# Patient Record
Sex: FEMALE | Race: WHITE | NOT HISPANIC OR LATINO
[De-identification: names, ages, dates, MRNs, and addresses within clinical notes are randomized per-mention and may not be internally consistent; named-entity substitution may affect disease eponyms.]

---
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
## 2023-06-17 ENCOUNTER — APPOINTMENT (OUTPATIENT)
Dept: URGENT CARE | Age: 18
End: 2023-06-17

## 2024-09-12 ENCOUNTER — HOSPITAL ENCOUNTER (EMERGENCY)
Facility: CLINIC | Age: 19
Discharge: HOME | End: 2024-09-12
Attending: EMERGENCY MEDICINE | Admitting: EMERGENCY MEDICINE
Payer: COMMERCIAL

## 2024-09-12 ENCOUNTER — APPOINTMENT (OUTPATIENT)
Dept: CT IMAGING | Facility: CLINIC | Age: 19
End: 2024-09-12
Attending: EMERGENCY MEDICINE
Payer: COMMERCIAL

## 2024-09-12 VITALS
WEIGHT: 140 LBS | BODY MASS INDEX: 21.97 KG/M2 | HEIGHT: 67 IN | TEMPERATURE: 97.6 F | HEART RATE: 51 BPM | SYSTOLIC BLOOD PRESSURE: 103 MMHG | DIASTOLIC BLOOD PRESSURE: 73 MMHG | OXYGEN SATURATION: 99 % | RESPIRATION RATE: 14 BRPM

## 2024-09-12 DIAGNOSIS — E87.6 HYPOKALEMIA: ICD-10-CM

## 2024-09-12 DIAGNOSIS — D64.9 ANEMIA, UNSPECIFIED TYPE: ICD-10-CM

## 2024-09-12 DIAGNOSIS — R00.1 SINUS BRADYCARDIA: ICD-10-CM

## 2024-09-12 DIAGNOSIS — R55 SYNCOPE, UNSPECIFIED SYNCOPE TYPE: ICD-10-CM

## 2024-09-12 DIAGNOSIS — S09.90XA CLOSED HEAD INJURY, INITIAL ENCOUNTER: ICD-10-CM

## 2024-09-12 LAB
ANION GAP SERPL CALCULATED.3IONS-SCNC: 10 MMOL/L (ref 7–15)
ATRIAL RATE - MUSE: 40 BPM
BASOPHILS # BLD AUTO: 0.1 10E3/UL (ref 0–0.2)
BASOPHILS NFR BLD AUTO: 1 %
BUN SERPL-MCNC: 13.9 MG/DL (ref 6–20)
CALCIUM SERPL-MCNC: 9.1 MG/DL (ref 8.8–10.4)
CHLORIDE SERPL-SCNC: 103 MMOL/L (ref 98–107)
CREAT SERPL-MCNC: 1.02 MG/DL (ref 0.51–0.95)
DIASTOLIC BLOOD PRESSURE - MUSE: NORMAL MMHG
EGFRCR SERPLBLD CKD-EPI 2021: 80 ML/MIN/1.73M2
EOSINOPHIL # BLD AUTO: 0.1 10E3/UL (ref 0–0.7)
EOSINOPHIL NFR BLD AUTO: 2 %
ERYTHROCYTE [DISTWIDTH] IN BLOOD BY AUTOMATED COUNT: 17 % (ref 10–15)
GLUCOSE BLDC GLUCOMTR-MCNC: 79 MG/DL (ref 70–99)
GLUCOSE SERPL-MCNC: 89 MG/DL (ref 70–99)
HCG SERPL QL: NEGATIVE
HCO3 SERPL-SCNC: 24 MMOL/L (ref 22–29)
HCT VFR BLD AUTO: 34.8 % (ref 35–47)
HGB BLD-MCNC: 10.5 G/DL (ref 11.7–15.7)
HOLD SPECIMEN: NORMAL
IMM GRANULOCYTES # BLD: 0 10E3/UL
IMM GRANULOCYTES NFR BLD: 0 %
INTERPRETATION ECG - MUSE: NORMAL
LYMPHOCYTES # BLD AUTO: 2.8 10E3/UL (ref 0.8–5.3)
LYMPHOCYTES NFR BLD AUTO: 53 %
MAGNESIUM SERPL-MCNC: 2.1 MG/DL (ref 1.7–2.3)
MCH RBC QN AUTO: 23.2 PG (ref 26.5–33)
MCHC RBC AUTO-ENTMCNC: 30.2 G/DL (ref 31.5–36.5)
MCV RBC AUTO: 77 FL (ref 78–100)
MONOCYTES # BLD AUTO: 0.4 10E3/UL (ref 0–1.3)
MONOCYTES NFR BLD AUTO: 8 %
NEUTROPHILS # BLD AUTO: 1.9 10E3/UL (ref 1.6–8.3)
NEUTROPHILS NFR BLD AUTO: 36 %
NRBC # BLD AUTO: 0 10E3/UL
NRBC BLD AUTO-RTO: 0 /100
P AXIS - MUSE: 59 DEGREES
PLATELET # BLD AUTO: 289 10E3/UL (ref 150–450)
POTASSIUM SERPL-SCNC: 3.3 MMOL/L (ref 3.4–5.3)
PR INTERVAL - MUSE: 200 MS
QRS DURATION - MUSE: 100 MS
QT - MUSE: 490 MS
QTC - MUSE: 399 MS
R AXIS - MUSE: 77 DEGREES
RBC # BLD AUTO: 4.53 10E6/UL (ref 3.8–5.2)
SODIUM SERPL-SCNC: 137 MMOL/L (ref 135–145)
SYSTOLIC BLOOD PRESSURE - MUSE: NORMAL MMHG
T AXIS - MUSE: 45 DEGREES
VENTRICULAR RATE- MUSE: 40 BPM
WBC # BLD AUTO: 5.3 10E3/UL (ref 4–11)

## 2024-09-12 PROCEDURE — 96360 HYDRATION IV INFUSION INIT: CPT

## 2024-09-12 PROCEDURE — 258N000003 HC RX IP 258 OP 636: Performed by: EMERGENCY MEDICINE

## 2024-09-12 PROCEDURE — 36415 COLL VENOUS BLD VENIPUNCTURE: CPT | Performed by: EMERGENCY MEDICINE

## 2024-09-12 PROCEDURE — 82962 GLUCOSE BLOOD TEST: CPT

## 2024-09-12 PROCEDURE — 84703 CHORIONIC GONADOTROPIN ASSAY: CPT | Performed by: EMERGENCY MEDICINE

## 2024-09-12 PROCEDURE — 99285 EMERGENCY DEPT VISIT HI MDM: CPT | Mod: 25

## 2024-09-12 PROCEDURE — 85041 AUTOMATED RBC COUNT: CPT | Performed by: EMERGENCY MEDICINE

## 2024-09-12 PROCEDURE — 70450 CT HEAD/BRAIN W/O DYE: CPT

## 2024-09-12 PROCEDURE — 83735 ASSAY OF MAGNESIUM: CPT | Performed by: EMERGENCY MEDICINE

## 2024-09-12 PROCEDURE — 250N000013 HC RX MED GY IP 250 OP 250 PS 637: Performed by: EMERGENCY MEDICINE

## 2024-09-12 PROCEDURE — 80048 BASIC METABOLIC PNL TOTAL CA: CPT | Performed by: EMERGENCY MEDICINE

## 2024-09-12 PROCEDURE — 93005 ELECTROCARDIOGRAM TRACING: CPT

## 2024-09-12 RX ORDER — ACETAMINOPHEN 500 MG
1000 TABLET ORAL
Status: COMPLETED | OUTPATIENT
Start: 2024-09-12 | End: 2024-09-12

## 2024-09-12 RX ADMIN — ACETAMINOPHEN 1000 MG: 500 TABLET, FILM COATED ORAL at 08:31

## 2024-09-12 RX ADMIN — SODIUM CHLORIDE 1000 ML: 9 INJECTION, SOLUTION INTRAVENOUS at 07:51

## 2024-09-12 ASSESSMENT — COLUMBIA-SUICIDE SEVERITY RATING SCALE - C-SSRS
2. HAVE YOU ACTUALLY HAD ANY THOUGHTS OF KILLING YOURSELF IN THE PAST MONTH?: NO
1. IN THE PAST MONTH, HAVE YOU WISHED YOU WERE DEAD OR WISHED YOU COULD GO TO SLEEP AND NOT WAKE UP?: NO
6. HAVE YOU EVER DONE ANYTHING, STARTED TO DO ANYTHING, OR PREPARED TO DO ANYTHING TO END YOUR LIFE?: NO

## 2024-09-12 ASSESSMENT — ACTIVITIES OF DAILY LIVING (ADL)
ADLS_ACUITY_SCORE: 35
ADLS_ACUITY_SCORE: 35

## 2024-09-12 NOTE — ED PROVIDER NOTES
"    Emergency Department Note      History of Present Illness     Chief Complaint   Fall      HPI   Renuka Pierce is a 18 year old female who presents to the ED for evaluation of syncope. Per nurse report, the patient had sudden onset lightheadedness, dizziness, and weakness followed by loss of consciousness. She fell to the ground, striking the back of her head. She then showed signs of seizure-like activity for about 10-15 seconds after which she regained copiousness. No postictal state or confusion upon waking. No tongue laceration or bowel or bladder incontinence. Patient reports she felt at baseline this morning and had a bagel for breakfast. She was standing near a patient in [facility] [employer] when the episode occurred. She now has a posterior headache from the fall but otherwise feels well. Denies chest pain, shortness of breath, or tachycardia. Denies family history of heart disease or unexplained death.     Independent Historian   Nurse as detailed above.        Past Medical History     Medical History and Problem List   No other significant past medical history or family history.    Medications   The patient is not currently taking any prescribed medications.    Physical Exam     Patient Vitals for the past 24 hrs:   BP Temp Temp src Pulse Resp SpO2 Height Weight   09/12/24 0920 -- -- -- 51 14 99 % -- --   09/12/24 0915 103/73 -- -- -- 12 100 % -- --   09/12/24 0828 -- -- -- 54 20 -- -- --   09/12/24 0827 -- -- -- -- -- 99 % -- --   09/12/24 0747 112/76 97.6  F (36.4  C) Temporal (!) 49 16 99 % 1.702 m (5' 7\") 63.5 kg (140 lb)     Physical Exam  VS: Reviewed per above  HENT: Mucous membranes moist, no nuchal rigidity  EYES: sclera anicteric  CV: Rate as noted,  regular rhythm.   RESP: Effort normal. Breath sounds are normal bilaterally.  GI: no tenderness/rebound/guarding, not distended.  NEURO: GCS 15, cranial nerves II through XII are intact, 5 out of 5 strength in all 4 extremities, " sensation is intact light touch in all 4 extremities  MSK: No deformity of the extremities  SKIN: Warm and dry    Diagnostics     Lab Results   Labs Ordered and Resulted from Time of ED Arrival to Time of ED Departure   BASIC METABOLIC PANEL - Abnormal       Result Value    Sodium 137      Potassium 3.3 (*)     Chloride 103      Carbon Dioxide (CO2) 24      Anion Gap 10      Urea Nitrogen 13.9      Creatinine 1.02 (*)     GFR Estimate 80      Calcium 9.1      Glucose 89     CBC WITH PLATELETS AND DIFFERENTIAL - Abnormal    WBC Count 5.3      RBC Count 4.53      Hemoglobin 10.5 (*)     Hematocrit 34.8 (*)     MCV 77 (*)     MCH 23.2 (*)     MCHC 30.2 (*)     RDW 17.0 (*)     Platelet Count 289      % Neutrophils 36      % Lymphocytes 53      % Monocytes 8      % Eosinophils 2      % Basophils 1      % Immature Granulocytes 0      NRBCs per 100 WBC 0      Absolute Neutrophils 1.9      Absolute Lymphocytes 2.8      Absolute Monocytes 0.4      Absolute Eosinophils 0.1      Absolute Basophils 0.1      Absolute Immature Granulocytes 0.0      Absolute NRBCs 0.0     HCG QUALITATIVE PREGNANCY - Normal    hCG Serum Qualitative Negative     GLUCOSE BY METER - Normal    GLUCOSE BY METER POCT 79     MAGNESIUM - Normal    Magnesium 2.1         Imaging   Head CT w/o contrast   Final Result   IMPRESSION:  Normal head CT.      Radiation dose for this scan was reduced using automated exposure   control, adjustment of the mA and/or kV according to patient size, or   iterative reconstruction technique.      SAIMA CAMACHO MD            SYSTEM ID:  [id number]          EKG   ECG taken at 0758, ECG read at 0805  Marked sinus bradycardia with sinus arrhythmia   Rate 40 bpm. AK interval 200 ms. QRS duration 100 ms. QT/QTc 490/399 ms. P-R-T axes 59 77 45.    Independent Interpretation   None    ED Course      Medications Administered   Medications   sodium chloride 0.9% BOLUS 1,000 mL (0 mLs Intravenous Stopped 9/12/24 0907)   acetaminophen  "(TYLENOL) tablet 1,000 mg (1,000 mg Oral $Given 9/12/24 0831)       Procedures   Procedures     ED Course   ED Course as of 09/12/24 1422   Thu Sep 12, 2024   5230 I obtained history and performed a physical exam as noted above.    0758 Spoke with RN who witnessed episode. They were in a pt room and pt had fallen on her back with head on the bathroom floor. Pt saw pt lean against the wall and slide down wall. Pts arms were brought to chest and was seen to be \"seizing\" per pt in the room with arms clenched to her chest (who is also a nurse). When nurse in the room turned around pt was not seen to be seizing. There was no post event disorientation and pt came to within a matter of seconds.        Additional Documentation  None    Medical Decision Making / Diagnosis     CMS Diagnoses: None    MIPS       None    MDM   Renuka Pierce is a 18 year old female who presents to the ER after syncopal episode.  Vital signs notable for bradycardia in the 40-50 range and softer blood pressure in the low 100 systolic range.  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  She has no focal neurological deficits.  Noncontrast head CT is negative for acute traumatic injury.  Based on history, my suspicion for occult seizure is lower.  Suspect syncopal event with some mild clonus given brevity of episode and lack of postictal state.  After IV fluids, she ambulated in the ER without any ongoing symptoms.  Considered occult orthostatic or vasovagal type syncope.  She has mild hypokalemia and was given orange juice here.  We discussed her underlying anemia, which she tells me she has been told before.  Recommended multivitamin with iron.  Encouraged outpatient primary care follow-up to discuss further.    Disposition   The patient was discharged.     Diagnosis     ICD-10-CM    1. Syncope, unspecified syncope type  R55 Primary Care Referral      2. Closed head injury, initial encounter  S09.90XA " Primary Care Referral      3. Anemia, unspecified type  D64.9 Primary Care Referral      4. Sinus bradycardia  R00.1 Primary Care Referral      5. Hypokalemia  E87.6 Primary Care Referral           Discharge Medications   There are no discharge medications for this patient.        Scribe Disclosure:  I, Brittany Hernandez, am serving as a scribe at 7:45 AM on 9/12/2024 to document services personally performed by Marcell Anguiano MD based on my observations and the provider's statements to me.        Marcell Anguiano MD  09/12/24 7520

## 2024-09-12 NOTE — ED TRIAGE NOTES
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 Per roberto RN, pt felt dizzy and weak when she hit back of head and had LOC. Staff reported approx 15 sec upper arm shaking while her eyes were open. Pt was not positical. She did not bite her tongue or loose control of bowel/bladder. Pt states she had a bagel for breakfast. No medical hx. ABC intact. A&Ox4. BG in ED 79.      Triage Assessment (Adult)       Row Name 09/12/24 0748          Triage Assessment    Airway WDL WDL        Respiratory WDL    Respiratory WDL WDL        Peripheral/Neurovascular WDL    Peripheral Neurovascular WDL WDL        Cognitive/Neuro/Behavioral WDL    Cognitive/Neuro/Behavioral WDL WDL

## 2024-09-12 NOTE — DISCHARGE INSTRUCTIONS
Discharge Instructions  Syncope    Syncope (fainting) is a sudden, short loss of consciousness (passing out spell). People will usually fall to the ground when they faint or slump over if seated.  People may also shake when this happens, and it can sometimes be difficult to tell the difference between syncope and a seizure. At this time, your provider does not find a reason to suspect that your fainting spell is a sign of anything dangerous or life-threatening.  However, sometimes the signs of serious illness do not show up right away.     Generally, every Emergency Department visit should have a follow-up clinic visit with either a primary or a specialty clinic/provider. Please follow-up as instructed by your emergency provider today.    Return to the Emergency Department if:  You faint again.   You have any significant bleeding.  You have chest pain or a fast or irregular heartbeat.  You feel short of breath.  You cough up any blood.  You have abdominal (belly) pain or unusual back pain.  You have ongoing vomiting (throwing up) or diarrhea (loose stools).  You have a black or tarry bowel movement, or blood in the stool or in your vomit.  You have a fever over 101 F.  You lose feeling or cannot move a part of your body or cannot talk normally.  You are confused, have a headache, cannot see well, or have a seizure.  DO NOT DRIVE. CALL 911 INSTEAD!    What can I do to help myself?  Follow any specific instructions that your provider discussed with you.  If you feel light-headed, make sure to sit down right away, even if you have to sit on the floor.  Follow up with your regular medical provider as discussed for further management. This may include lowering your blood pressure medications, insulin or other diabetic medications, checking your blood sugar more frequently, and drinking more fluids, taking medicines for vomiting or diarrhea or getting up slower.  If you were given a prescription for medicine here today,  be sure to read all of the information (including the package insert) that comes with your prescription.  This will include important information about the medicine, its side effects, and any warnings that you need to know about.  The pharmacist who fills the prescription can provide more information and answer questions you may have about the medicine.  If you have questions or concerns that the pharmacist cannot address, please call or return to the Emergency Department.   Remember that you can always come back to the Emergency Department if you are not able to see your regular provider in the amount of time listed above, if you get any new symptoms, or if there is anything that worries you.    Discharge Instructions  Head Injury    You have been seen today for a head injury. Your evaluation included a history and physical examination. You may have had a CT (CAT) scan performed, though most head injuries do not require a scan. Based on this evaluation, your provider today does not feel that your head injury is serious.    Generally, every Emergency Department visit should have a follow-up clinic visit with either a primary or a specialty clinic/provider. Please follow-up as instructed by your emergency provider today.  Return to the Emergency Department if:  You are confused or you are not acting right.  Your headache gets worse or you start to have a really bad headache even with your recommended treatment plan.  You vomit (throw up) more than once.  You have a seizure.  You have trouble walking.  You have weakness or paralysis (cannot move) in an arm or a leg.  You have blood or fluid coming from your ears or nose.  You have new symptoms or anything that worries you.    Sleeping:  It is okay for you to sleep, but someone should wake you up if instructed by your provider, and someone should check on you at your usual time to wake up.     Activity:  Do not drive for at least 24 hours.  Do not drive if you have  dizzy spells or trouble concentrating, or remembering things.  Do not return to any contact sports until cleared by your regular provider.     MORE INFORMATION:    Concussion:  A concussion is a minor head injury that may cause temporary problems with the way the brain works. Although concussions are important, they are generally not an emergency or a reason that a person needs to be hospitalized. Some concussion symptoms include confusion, amnesia (forgetful), nausea (sick to your stomach) and vomiting (throwing up), dizziness, fatigue, memory or concentration problems, irritability and sleep problems. For most people, concussions are mild and temporary but some will have more severe and persistent symptoms that require on-going care and treatment.  CT Scans: Your evaluation today may have included a CT scan (CAT scan) to look for things like bleeding or a skull fracture (broken bone).  CT scans involve radiation and too many CT scans can cause serious health problems like cancer, especially in children.  Because of this, your provider may not have ordered a CT scan today if they think you are at low risk for a serious or life threatening problem.    Blood Thinners. If you take blood thinners, there is a very small risk of delayed bleeding after your head injury. In the days/weeks to come, watch for the symptoms described above particularly headaches, confusion, problems walking, and weakness in an arm or leg.    If you were given a prescription for medicine here today, be sure to read all of the information (including the package insert) that comes with your prescription.  This will include important information about the medicine, its side effects, and any warnings that you need to know about.  The pharmacist who fills the prescription can provide more information and answer questions you may have about the medicine.  If you have questions or concerns that the pharmacist cannot address, please call or return to  the Emergency Department.     Remember that you can always come back to the Emergency Department if you are not able to see your regular provider in the amount of time listed above, if you get any new symptoms, or if there is anything that worries you.